# Patient Record
Sex: MALE | Race: WHITE
[De-identification: names, ages, dates, MRNs, and addresses within clinical notes are randomized per-mention and may not be internally consistent; named-entity substitution may affect disease eponyms.]

---

## 2019-08-01 ENCOUNTER — HOSPITAL ENCOUNTER (OUTPATIENT)
Dept: HOSPITAL 56 - MW.ED | Age: 29
Setting detail: OBSERVATION
LOS: 1 days | Discharge: HOME | End: 2019-08-02
Attending: INTERNAL MEDICINE | Admitting: INTERNAL MEDICINE
Payer: COMMERCIAL

## 2019-08-01 DIAGNOSIS — E66.01: ICD-10-CM

## 2019-08-01 DIAGNOSIS — Z79.84: ICD-10-CM

## 2019-08-01 DIAGNOSIS — I10: ICD-10-CM

## 2019-08-01 DIAGNOSIS — Z79.899: ICD-10-CM

## 2019-08-01 DIAGNOSIS — E78.5: ICD-10-CM

## 2019-08-01 DIAGNOSIS — E11.9: ICD-10-CM

## 2019-08-01 DIAGNOSIS — R07.9: Primary | ICD-10-CM

## 2019-08-01 DIAGNOSIS — Z87.891: ICD-10-CM

## 2019-08-01 LAB
BUN SERPL-MCNC: 22 MG/DL (ref 7–18)
CHLORIDE SERPL-SCNC: 102 MMOL/L (ref 98–107)
CO2 SERPL-SCNC: 25.9 MMOL/L (ref 21–32)
GLUCOSE SERPL-MCNC: 172 MG/DL (ref 74–106)
HBA1C BLD-MCNC: 7.3 % (ref 4.5–6.2)
POTASSIUM SERPL-SCNC: 4.5 MMOL/L (ref 3.5–5.1)
SODIUM SERPL-SCNC: 139 MMOL/L (ref 136–148)

## 2019-08-01 PROCEDURE — 84443 ASSAY THYROID STIM HORMONE: CPT

## 2019-08-01 PROCEDURE — 80061 LIPID PANEL: CPT

## 2019-08-01 PROCEDURE — 80305 DRUG TEST PRSMV DIR OPT OBS: CPT

## 2019-08-01 PROCEDURE — 82962 GLUCOSE BLOOD TEST: CPT

## 2019-08-01 PROCEDURE — 93005 ELECTROCARDIOGRAM TRACING: CPT

## 2019-08-01 PROCEDURE — 85027 COMPLETE CBC AUTOMATED: CPT

## 2019-08-01 PROCEDURE — 85025 COMPLETE CBC W/AUTO DIFF WBC: CPT

## 2019-08-01 PROCEDURE — 81001 URINALYSIS AUTO W/SCOPE: CPT

## 2019-08-01 PROCEDURE — 80053 COMPREHEN METABOLIC PANEL: CPT

## 2019-08-01 PROCEDURE — 85379 FIBRIN DEGRADATION QUANT: CPT

## 2019-08-01 PROCEDURE — 84439 ASSAY OF FREE THYROXINE: CPT

## 2019-08-01 PROCEDURE — 99285 EMERGENCY DEPT VISIT HI MDM: CPT

## 2019-08-01 PROCEDURE — 84484 ASSAY OF TROPONIN QUANT: CPT

## 2019-08-01 PROCEDURE — 80048 BASIC METABOLIC PNL TOTAL CA: CPT

## 2019-08-01 PROCEDURE — 71045 X-RAY EXAM CHEST 1 VIEW: CPT

## 2019-08-01 PROCEDURE — 84481 FREE ASSAY (FT-3): CPT

## 2019-08-01 PROCEDURE — 83036 HEMOGLOBIN GLYCOSYLATED A1C: CPT

## 2019-08-01 PROCEDURE — 85610 PROTHROMBIN TIME: CPT

## 2019-08-01 PROCEDURE — 36415 COLL VENOUS BLD VENIPUNCTURE: CPT

## 2019-08-01 PROCEDURE — 93306 TTE W/DOPPLER COMPLETE: CPT

## 2019-08-01 PROCEDURE — G0378 HOSPITAL OBSERVATION PER HR: HCPCS

## 2019-08-01 RX ADMIN — NITROGLYCERIN PRN MG: 0.4 TABLET SUBLINGUAL at 10:16

## 2019-08-01 RX ADMIN — NITROGLYCERIN PRN MG: 0.4 TABLET SUBLINGUAL at 10:22

## 2019-08-01 RX ADMIN — NITROGLYCERIN PRN MG: 0.4 TABLET SUBLINGUAL at 10:09

## 2019-08-01 NOTE — PCM.HP
H&P History of Present Illness





- General


Date of Service: 08/01/19


Admit Problem/Dx: 


 Admission Diagnosis/Problem





Admission Diagnosis/Problem      Chest pain











- History of Present Illness


Initial Comments - Free Text/Narative: 





29 y/o male with history of type 2 diabetes who presented to the ER complaining 

of chest pain. Patient was at work and stated he suddenly started feeling sharp 

substernal chest pain. No radiation to neck or arm. States pain lasted more 

than 20 minutes. Tender to palpation on sternocostal area. No trauma to chest. 

No history of anxiety, panic attacks. States he was feeling short of breath as 

well and nauseous. NO vomiting. No history of asthma or acid reflux. Pain 

resolved after nitro paste was administered. Received full dose aspirin. EKG 

did not show any acute ST changes. Sinus rhythm.


Former smoker quit about 2-3 months ago. No alcohol drinking. States he takes 

metformin and recently stopped taking lisinopril. No statin. 


  ** Left Chest


Pain Score (Numeric/FACES): 5





- Related Data


Allergies/Adverse Reactions: 


 Allergies











Allergy/AdvReac Type Severity Reaction Status Date / Time


 


No Known Allergies Allergy   Verified 08/01/19 09:44











Home Medications: 


 Home Meds





metFORMIN [Glucophage] 500 mg PO DAILY 08/01/19 [History]











Past Medical History





- Past Health History


Medical/Surgical History: Denies Medical/Surgical History


Cardiovascular History: Reports: Hypertension


Musculoskeletal History: Reports: Back Pain, Chronic


Neurological History: Reports: Concussion, Seizure


Endocrine/Metabolic History: Reports: Diabetes, Type II





- Infectious Disease History


Infectious Disease History: Reports: Chicken Pox





- Past Surgical History


HEENT Surgical History: Reports: Adenoidectomy, Tonsillectomy


Cardiovascular Surgical History: Reports: None


Endocrine Surgical History: Reports: None


Neurological Surgical History: Reports: None


Musculoskeletal Surgical History: Reports: None





Social & Family History





- Family History


Family Medical History: Noncontributory


Cardiac: Reports: Cardiomyopathy, High Cholesterol, Hypertension


Endocrine/Metabolic: Reports: Diabetes, Type I, Diabetes, type II





- Tobacco Use


Smoking Status *Q: Former Smoker


Years of Tobacco use: 2


Used Tobacco, but Quit: Yes


Month/Year Tobacco Last Used: 06/2019





- Caffeine Use


Caffeine Use: Reports: None


Caffeine Use Comment: occasional caffeine use





- Recreational Drug Use


Recreational Drug Use: No





H&P Review of Systems





- Review of Systems:


Review Of Systems: ROS reveals no pertinent complaints other than HPI.





Exam





- Exam


Exam: See Below





- Vital Signs


Vital Signs: 


 Last Vital Signs











Temp  36.3 C   08/01/19 12:13


 


Pulse  87   08/01/19 12:13


 


Resp  18   08/01/19 12:13


 


BP  142/95 H  08/01/19 12:13


 


Pulse Ox  95   08/01/19 12:13











Weight: 204.117 kg





- Exam


General: Alert, Oriented, Cooperative


Lungs: Clear to Auscultation, Normal Respiratory Effort.  No: Crackles, Rhonchi

, Wheezing


Cardiovascular: Regular Rate, Regular Rhythm, Other (left sternocostal region 

is tender to palpation.).  No: Systolic Murmur


GI/Abdominal Exam: Normal Bowel Sounds, Soft, Non-Tender


Back Exam: Normal Inspection.  No: CVA Tenderness (L), CVA Tenderness (R)


Extremities: Normal Inspection, Normal Capillary Refill


Skin: Warm, Dry


Neuro Extensive - Mental Status: Alert, Oriented x3





- Patient Data


Lab Results Last 24 hrs: 


 Laboratory Results - last 24 hr











  08/01/19 08/01/19 08/01/19 Range/Units





  09:45 09:45 09:45 


 


WBC  7.96    (4.0-11.0)  K/uL


 


RBC  5.78    (4.50-5.90)  M/uL


 


Hgb  15.4    (13.0-17.0)  g/dL


 


Hct  46.9    (38.0-50.0)  %


 


MCV  81.1    (80.0-98.0)  fL


 


MCH  26.6 L    (27.0-32.0)  pg


 


MCHC  32.8    (31.0-37.0)  g/dL


 


RDW Std Deviation  41.7    (28.0-62.0)  fl


 


RDW Coeff of Dion  14    (11.0-15.0)  %


 


Plt Count  311    (150-400)  K/uL


 


MPV  9.50    (7.40-12.00)  fL


 


Neut % (Auto)  54.0    (48.0-80.0)  %


 


Lymph % (Auto)  36.1    (16.0-40.0)  %


 


Mono % (Auto)  7.5    (0.0-15.0)  %


 


Eos % (Auto)  2.0    (0.0-7.0)  %


 


Baso % (Auto)  0.4    (0.0-1.5)  %


 


Neut # (Auto)  4.3    (1.4-5.7)  K/uL


 


Lymph # (Auto)  2.9 H    (0.6-2.4)  K/uL


 


Mono # (Auto)  0.6    (0.0-0.8)  K/uL


 


Eos # (Auto)  0.2    (0.0-0.7)  K/uL


 


Baso # (Auto)  0.0    (0.0-0.1)  K/uL


 


Nucleated RBC %  0.0    /100WBC


 


Nucleated RBCs #  0    K/uL


 


INR   0.99   


 


D-Dimer, Quantitative   < 0.19   (0.0-0.50)  mg/L FEU


 


Sodium    139  (136-148)  mmol/L


 


Potassium    4.5  (3.5-5.1)  mmol/L


 


Chloride    102  ()  mmol/L


 


Carbon Dioxide    25.9  (21.0-32.0)  mmol/L


 


BUN    22 H  (7.0-18.0)  mg/dL


 


Creatinine    1.2  (0.8-1.3)  mg/dL


 


Est Cr Clr Drug Dosing    117.42  mL/min


 


Estimated GFR (MDRD)    > 60.0  ml/min


 


Glucose    172 H  ()  mg/dL


 


Hemoglobin A1c     (4.5-6.2)  %


 


Calcium    9.9  (8.5-10.1)  mg/dL


 


Total Bilirubin    0.4  (0.2-1.0)  mg/dL


 


AST    22  (15-37)  IU/L


 


ALT    65 H  (14-63)  IU/L


 


Alkaline Phosphatase    100  ()  U/L


 


Troponin I    < 0.050  (0.000-0.056)  ng/mL


 


Total Protein    8.2  (6.4-8.2)  g/dL


 


Albumin    4.5  (3.4-5.0)  g/dL


 


Globulin    3.7  (2.6-4.0)  g/dL


 


Albumin/Globulin Ratio    1.2  (0.9-1.6)  


 


Triglycerides     (0-200)  mg/dL


 


Cholesterol     ()  mg/dL


 


HDL Cholesterol     (40-60)  mg/dL


 


Cholesterol/HDL Ratio     (3.3-6.0)  


 


TSH 3rd Generation     (0.36-3.74)  uIU/mL


 


Urine Color     


 


Urine Appearance     


 


Urine pH     (5.0-8.0)  


 


Ur Specific Gravity     (1.001-1.035)  


 


Urine Protein     (NEGATIVE)  mg/dL


 


Urine Glucose (UA)     (NEGATIVE)  mg/dL


 


Urine Ketones     (NEGATIVE)  mg/dL


 


Urine Occult Blood     (NEGATIVE)  


 


Urine Nitrite     (NEGATIVE)  


 


Urine Bilirubin     (NEGATIVE)  


 


Urine Urobilinogen     (<2.0)  EU/dL


 


Ur Leukocyte Esterase     (NEGATIVE)  


 


Urine RBC     (0-2/HPF)  


 


Urine WBC     (0-5/HPF)  


 


Ur Epithelial Cells     (NONE-FEW)  


 


Urine Bacteria     (NEGATIVE)  


 


Urine Opiates Screen     (NEGATIVE)  


 


Ur Oxycodone Screen     (NEGATIVE)  


 


Urine Methadone Screen     (NEGATIVE)  


 


Ur Barbiturates Screen     (NEGATIVE)  


 


Ur Phencyclidine Scrn     (NEGATIVE)  


 


Ur Amphetamine Screen     (NEGATIVE)  


 


U Methamphetamines Scrn     (NEGATIVE)  


 


U Benzodiazepines Scrn     (NEGATIVE)  


 


U Cocaine Metab Screen     (NEGATIVE)  


 


U Marijuana (THC) Screen     (NEGATIVE)  














  08/01/19 08/01/19 08/01/19 Range/Units





  09:45 09:45 11:30 


 


WBC     (4.0-11.0)  K/uL


 


RBC     (4.50-5.90)  M/uL


 


Hgb     (13.0-17.0)  g/dL


 


Hct     (38.0-50.0)  %


 


MCV     (80.0-98.0)  fL


 


MCH     (27.0-32.0)  pg


 


MCHC     (31.0-37.0)  g/dL


 


RDW Std Deviation     (28.0-62.0)  fl


 


RDW Coeff of Dion     (11.0-15.0)  %


 


Plt Count     (150-400)  K/uL


 


MPV     (7.40-12.00)  fL


 


Neut % (Auto)     (48.0-80.0)  %


 


Lymph % (Auto)     (16.0-40.0)  %


 


Mono % (Auto)     (0.0-15.0)  %


 


Eos % (Auto)     (0.0-7.0)  %


 


Baso % (Auto)     (0.0-1.5)  %


 


Neut # (Auto)     (1.4-5.7)  K/uL


 


Lymph # (Auto)     (0.6-2.4)  K/uL


 


Mono # (Auto)     (0.0-0.8)  K/uL


 


Eos # (Auto)     (0.0-0.7)  K/uL


 


Baso # (Auto)     (0.0-0.1)  K/uL


 


Nucleated RBC %     /100WBC


 


Nucleated RBCs #     K/uL


 


INR     


 


D-Dimer, Quantitative     (0.0-0.50)  mg/L FEU


 


Sodium     (136-148)  mmol/L


 


Potassium     (3.5-5.1)  mmol/L


 


Chloride     ()  mmol/L


 


Carbon Dioxide     (21.0-32.0)  mmol/L


 


BUN     (7.0-18.0)  mg/dL


 


Creatinine     (0.8-1.3)  mg/dL


 


Est Cr Clr Drug Dosing     mL/min


 


Estimated GFR (MDRD)     ml/min


 


Glucose     ()  mg/dL


 


Hemoglobin A1c   7.3 H   (4.5-6.2)  %


 


Calcium     (8.5-10.1)  mg/dL


 


Total Bilirubin     (0.2-1.0)  mg/dL


 


AST     (15-37)  IU/L


 


ALT     (14-63)  IU/L


 


Alkaline Phosphatase     ()  U/L


 


Troponin I     (0.000-0.056)  ng/mL


 


Total Protein     (6.4-8.2)  g/dL


 


Albumin     (3.4-5.0)  g/dL


 


Globulin     (2.6-4.0)  g/dL


 


Albumin/Globulin Ratio     (0.9-1.6)  


 


Triglycerides  405 H    (0-200)  mg/dL


 


Cholesterol  221 H    ()  mg/dL


 


HDL Cholesterol  35 L    (40-60)  mg/dL


 


Cholesterol/HDL Ratio  6.3 H    (3.3-6.0)  


 


TSH 3rd Generation  4.35 H    (0.36-3.74)  uIU/mL


 


Urine Color    YELLOW  


 


Urine Appearance    CLEAR  


 


Urine pH    5.5  (5.0-8.0)  


 


Ur Specific Gravity    >= 1.030  (1.001-1.035)  


 


Urine Protein    TRACE H  (NEGATIVE)  mg/dL


 


Urine Glucose (UA)    250 H  (NEGATIVE)  mg/dL


 


Urine Ketones    TRACE H  (NEGATIVE)  mg/dL


 


Urine Occult Blood    NEGATIVE  (NEGATIVE)  


 


Urine Nitrite    NEGATIVE  (NEGATIVE)  


 


Urine Bilirubin    NEGATIVE  (NEGATIVE)  


 


Urine Urobilinogen    0.2  (<2.0)  EU/dL


 


Ur Leukocyte Esterase    NEGATIVE  (NEGATIVE)  


 


Urine RBC    0-1  (0-2/HPF)  


 


Urine WBC    0-1  (0-5/HPF)  


 


Ur Epithelial Cells    RARE  (NONE-FEW)  


 


Urine Bacteria    RARE  (NEGATIVE)  


 


Urine Opiates Screen     (NEGATIVE)  


 


Ur Oxycodone Screen     (NEGATIVE)  


 


Urine Methadone Screen     (NEGATIVE)  


 


Ur Barbiturates Screen     (NEGATIVE)  


 


Ur Phencyclidine Scrn     (NEGATIVE)  


 


Ur Amphetamine Screen     (NEGATIVE)  


 


U Methamphetamines Scrn     (NEGATIVE)  


 


U Benzodiazepines Scrn     (NEGATIVE)  


 


U Cocaine Metab Screen     (NEGATIVE)  


 


U Marijuana (THC) Screen     (NEGATIVE)  














  08/01/19 Range/Units





  11:30 


 


WBC   (4.0-11.0)  K/uL


 


RBC   (4.50-5.90)  M/uL


 


Hgb   (13.0-17.0)  g/dL


 


Hct   (38.0-50.0)  %


 


MCV   (80.0-98.0)  fL


 


MCH   (27.0-32.0)  pg


 


MCHC   (31.0-37.0)  g/dL


 


RDW Std Deviation   (28.0-62.0)  fl


 


RDW Coeff of Dion   (11.0-15.0)  %


 


Plt Count   (150-400)  K/uL


 


MPV   (7.40-12.00)  fL


 


Neut % (Auto)   (48.0-80.0)  %


 


Lymph % (Auto)   (16.0-40.0)  %


 


Mono % (Auto)   (0.0-15.0)  %


 


Eos % (Auto)   (0.0-7.0)  %


 


Baso % (Auto)   (0.0-1.5)  %


 


Neut # (Auto)   (1.4-5.7)  K/uL


 


Lymph # (Auto)   (0.6-2.4)  K/uL


 


Mono # (Auto)   (0.0-0.8)  K/uL


 


Eos # (Auto)   (0.0-0.7)  K/uL


 


Baso # (Auto)   (0.0-0.1)  K/uL


 


Nucleated RBC %   /100WBC


 


Nucleated RBCs #   K/uL


 


INR   


 


D-Dimer, Quantitative   (0.0-0.50)  mg/L FEU


 


Sodium   (136-148)  mmol/L


 


Potassium   (3.5-5.1)  mmol/L


 


Chloride   ()  mmol/L


 


Carbon Dioxide   (21.0-32.0)  mmol/L


 


BUN   (7.0-18.0)  mg/dL


 


Creatinine   (0.8-1.3)  mg/dL


 


Est Cr Clr Drug Dosing   mL/min


 


Estimated GFR (MDRD)   ml/min


 


Glucose   ()  mg/dL


 


Hemoglobin A1c   (4.5-6.2)  %


 


Calcium   (8.5-10.1)  mg/dL


 


Total Bilirubin   (0.2-1.0)  mg/dL


 


AST   (15-37)  IU/L


 


ALT   (14-63)  IU/L


 


Alkaline Phosphatase   ()  U/L


 


Troponin I   (0.000-0.056)  ng/mL


 


Total Protein   (6.4-8.2)  g/dL


 


Albumin   (3.4-5.0)  g/dL


 


Globulin   (2.6-4.0)  g/dL


 


Albumin/Globulin Ratio   (0.9-1.6)  


 


Triglycerides   (0-200)  mg/dL


 


Cholesterol   ()  mg/dL


 


HDL Cholesterol   (40-60)  mg/dL


 


Cholesterol/HDL Ratio   (3.3-6.0)  


 


TSH 3rd Generation   (0.36-3.74)  uIU/mL


 


Urine Color   


 


Urine Appearance   


 


Urine pH   (5.0-8.0)  


 


Ur Specific Gravity   (1.001-1.035)  


 


Urine Protein   (NEGATIVE)  mg/dL


 


Urine Glucose (UA)   (NEGATIVE)  mg/dL


 


Urine Ketones   (NEGATIVE)  mg/dL


 


Urine Occult Blood   (NEGATIVE)  


 


Urine Nitrite   (NEGATIVE)  


 


Urine Bilirubin   (NEGATIVE)  


 


Urine Urobilinogen   (<2.0)  EU/dL


 


Ur Leukocyte Esterase   (NEGATIVE)  


 


Urine RBC   (0-2/HPF)  


 


Urine WBC   (0-5/HPF)  


 


Ur Epithelial Cells   (NONE-FEW)  


 


Urine Bacteria   (NEGATIVE)  


 


Urine Opiates Screen  NEGATIVE  (NEGATIVE)  


 


Ur Oxycodone Screen  NEGATIVE  (NEGATIVE)  


 


Urine Methadone Screen  NEGATIVE  (NEGATIVE)  


 


Ur Barbiturates Screen  NEGATIVE  (NEGATIVE)  


 


Ur Phencyclidine Scrn  NEGATIVE  (NEGATIVE)  


 


Ur Amphetamine Screen  NEGATIVE  (NEGATIVE)  


 


U Methamphetamines Scrn  NEGATIVE  (NEGATIVE)  


 


U Benzodiazepines Scrn  NEGATIVE  (NEGATIVE)  


 


U Cocaine Metab Screen  NEGATIVE  (NEGATIVE)  


 


U Marijuana (THC) Screen  NEGATIVE  (NEGATIVE)  











Result Diagrams: 


 08/01/19 09:45





 08/01/19 09:45


Problem List Initiated/Reviewed/Updated: Yes


Orders Last 24hrs: 


 Active Orders 24 hr











 Category Date Time Status


 


 Patient Status [ADT] Stat ADT  08/01/19 11:29 Active


 


 [facility]  08/01/19 11:49 Active


 


 Cardiac Monitoring [RC] Q8H Care  08/01/19 09:59 Active


 


 EKG Documentation Completion [RC] STAT Care  08/01/19 09:59 Active


 


 Oxygen Therapy [RC] PRN Care  08/01/19 11:43 Active


 


 Oxygen Therapy, ED [RC] ASDIRECTED Care  08/01/19 09:59 Active


 


 Pulse Oximetry [RC] ASDIRECTED Care  08/01/19 09:59 Active


 


 Up ad Kristina [RC] ASDIRECTED Care  08/01/19 11:43 Active


 


 VTE/DVT Education [RC] PER UNIT ROUTINE Care  08/01/19 11:43 Active


 


 Vital Signs [RC] Q4H Care  08/01/19 11:43 Active


 


 Heart Healthy Diet [DIET] Diet  08/01/19 Lunch Active


 


 T3 FREE [CHEM] Routine Lab  08/01/19 13:55 Ordered


 


 T4 FREE [CHEM] Routine Lab  08/01/19 13:55 Ordered


 


 TROPONIN I [CHEM] Q6H Lab  08/01/19 16:00 Ordered


 


 TROPONIN I [CHEM] Q6H Lab  08/01/19 22:00 Ordered


 


 Acetaminophen [Tylenol] Med  08/01/19 11:43 Active





 650 mg PO Q4H PRN   


 


 Acetaminophen/HYDROcodone [Norco 325-5 MG] Med  08/01/19 11:43 Active





 1 tab PO Q4H PRN   


 


 Enoxaparin [Lovenox] Med  08/01/19 11:45 Active





 40 mg SUBCUT Q24H   


 


 Insulin Aspart [NovoLOG] Med  08/01/19 17:00 Active





 See Protocol  SUBCUT TIDAC   


 


 Lisinopril [Prinivil] Med  08/01/19 14:00 Ordered





 5 mg PO DAILY   


 


 Morphine Med  08/01/19 11:43 Active





 2 mg IVPUSH Q2H PRN   


 


 Nitroglycerin [Nitrostat] Med  08/01/19 11:47 Active





 0.4 mg SL Q5M PRN   


 


 Ondansetron [Zofran ODT] Med  08/01/19 11:43 Active





 4 mg PO Q4H PRN   


 


 Ondansetron [Zofran] Med  08/01/19 11:43 Active





 4 mg IVPUSH Q4H PRN   


 


 Sodium Chloride 0.9% [Saline Flush] Med  08/01/19 09:59 Active





 10 ml FLUSH ASDIRECTED PRN   


 


 Sodium Chloride 0.9% [Saline Flush] Med  08/01/19 09:59 Active





 2.5 ml FLUSH ASDIRECTED PRN   


 


 atorvaSTATin [Lipitor] Med  08/01/19 21:00 Ordered





 40 mg PO BEDTIME   


 


 Saline Lock Insert [OM.PC] Stat Oth  08/01/19 09:59 Ordered


 


 Resuscitation Status Routine Resus Stat  08/01/19 11:43 Ordered








 Medication Orders





Acetaminophen (Tylenol)  650 mg PO Q4H PRN


   PRN Reason: Pain (Mild 1-3)/fever


Hydrocodone Bitart/Acetaminophen (Norco 325-5 Mg)  1 tab PO Q4H PRN


   PRN Reason: Pain (moderate 4-6)


Atorvastatin Calcium (Lipitor)  40 mg PO BEDTIME HARVEY


Enoxaparin Sodium (Lovenox)  40 mg SUBCUT Q24H HARVEY


   Last Admin: 08/01/19 13:16  Dose: 40 mg


Insulin Aspart (Novolog)  0 unit SUBCUT TIDAC HARVEY; Protocol


Lisinopril (Prinivil)  5 mg PO DAILY HARVEY


Morphine Sulfate (Morphine)  2 mg IVPUSH Q2H PRN


   PRN Reason: Pain (severe 7-10)


   Stop: 08/02/19 11:45


Nitroglycerin (Nitrostat)  0.4 mg SL Q5M PRN


   PRN Reason: Chest Pain


Ondansetron HCl (Zofran Odt)  4 mg PO Q4H PRN


   PRN Reason: nausea, able to take PO


Ondansetron HCl (Zofran)  4 mg IVPUSH Q4H PRN


   PRN Reason: Nausea


Sodium Chloride (Saline Flush)  10 ml FLUSH ASDIRECTED PRN


   PRN Reason: Keep Vein Open


   Last Admin: 08/01/19 10:09  Dose: 10 ml


Sodium Chloride (Saline Flush)  2.5 ml FLUSH ASDIRECTED PRN


   PRN Reason: Keep Vein Open


   Last Admin: 08/01/19 10:09  Dose: 2.5 ml








Assessment/Plan Comment:: 





A:


1. Chest pain, r/o ACS


2. Diabetes type 2


3. Dyslipidemia


4. Hypertension


5. Morbid obesity





P:


1. Chest pain- suspect sternocostal inflammation. However, since patient is 

diabetic, hypercholesterolemia, obese and former smoker will trend troponins, 

telemetry. Nitroglycerin PRN, morphine for pain control. Ordered Echo.


2. DM2- Recent HgA1c 7.3. Will start ISS. Holding metformin for now.


3. Dyslipidemia- will start Lipitor 40 mg PO daily.


4. Hypertension- will start lisinopril 5 mg PO daily.





Dispo: likely dc tomorrow.

## 2019-08-01 NOTE — CR
EXAM DATE: 19



PATIENT'S AGE: 28





Patient: FREDDIE MO



Facility: [facility] Patient ID: [id number]

Site Patient ID: [id number].

Site Accession #: [id number].

: 1990

Study: XRay-Chest [id number]/1/2019 11:22:55 AM

Ordering Physician: Hermilo Saldana

Final Report: 

INDICATION:

Shortness of breath.



COMPARISON:

None.



TECHNIQUE:

Portable AP chest.



FINDINGS:

Normal size cardiac silhouette. Clear lung fields without evidence of acute 
pneumonic infiltrates or CHF. No pneumothorax or pleural effusion.

Impression: Negative portable AP chest.



Dictated by Nani Orta MD @ Aug 1 2019 11:54AM

Signed by: Nani Orta MD @2019 11:55:59 AM

(Electronic Signature)



Report Signed by Proxy.
[facility]

## 2019-08-01 NOTE — EDM.PDOC
ED HPI GENERAL MEDICAL PROBLEM





- General


Chief Complaint: Chest Pain


Stated Complaint: CHEST PAIN


Time Seen by Provider: 08/01/19 09:53





- History of Present Illness


INITIAL COMMENTS - FREE TEXT/NARRATIVE: 





HISTORY AND PHYSICAL:





History of present illness:


The patient is a 28-year-old male who has a history of non-insulin-dependent 

diabetes and has a half a pack per day smoking history for which he quit 2 

months ago and presents with left-sided chest pain that started about 8:15 this 

morning. Patient said he had a completely normal day yesterday and has had no 

systemic issues and was awake and at work when this started. The patient says 

that it started gradually with some pressure just to the left of his sternum 

and then radiated slightly to the left breast area and then he had some sharp 

discomfort laterally on the left. There was some associated shortness of breath 

but no diaphoresis nausea or vomiting and no abdominal pain. He says that he 

came on gradually and he took some Aleve but it did not help. He says that he 

rates it as a 4 or 5 currently. He has no leg pain or swelling no abdominal 

complaints. He says he has no cough with this fevers or chills and has been 

eating and drinking normally. He says he did not do anything particularly 

strenuous today. The patient does admit that he has a history of cocaine use 

but no IV drug use and has not used cocaine for 3 years. He says he has no 

family history with his mother brothers or sisters for cardiac or pulmonary 

disease and he does not know very much about his father and so this is an 

unknown . The patient denies any leg pain or swelling.





Review of systems: 


As per history of present illness and below otherwise all systems reviewed and 

negative.





Past medical history: 


As per history of present illness and as reviewed below otherwise 

noncontributory.





Surgical history: 


As per history of present illness and as reviewed below otherwise 

noncontributory.





Social history: 


No reported history of drug or alcohol abuse.





Family history: 


As per history of present illness and as reviewed below otherwise 

noncontributory.





Physical exam:


General: Well-developed well-nourished obese man who is nontoxic and speaking 

clearly in the ED without breathlessness. Vital signs are noted by me.


HEENT: Atraumatic, normocephalic, pupils reactive, negative for conjunctival 

pallor or scleral icterus, mucous membranes moist, throat clear, neck supple, 

nontender, trachea midline.


Lungs: Clear to auscultation, breath sounds equal bilaterally, chest wall with 

some mild tenderness on palpation just to the left of the sternum but I cannot 

completely reproduce the pain and there is no defects deformities or crepitus


Heart: S1S2, regular, negative for clicks, rubs, or JVD.


Abdomen: Soft, nondistended, nontender. Negative for masses or 

hepatosplenomegaly. Negative for costovertebral tenderness.


Pelvis: Stable nontender.


Genitourinary: Deferred.


Rectal: Deferred.


Extremities: Atraumatic, negative for cords or calf pain. Neurovascular 

unremarkable. No pedal edema or leg asymmetry


Neuro: Awake, alert, oriented. Cranial nerves II through XII unremarkable. 

Cerebellum unremarkable. Motor and sensory unremarkable throughout. Exam 

nonfocal.





Diagnostics:


EKG CBC CMP INR troponin d-dimer UA UDS chest x-ray





Therapeutics:


IV O2 monitor aspirin nitroglycerin sublingual, nitroglycerin paste





Reevaluation--after 3 sublingual nitroglycerin the patient has no chest 

discomfort. Blood pressure has normalized and a half inch of nitro paste will 

be placed.


1130: TESTING results were discussed with the patient and observation admission 

has been advised. He remains pain-free. He is accepting of the admission. I 

discussed the case with Dr. Castaneda, the resident working with Dr. Rosas, and 

he accepts the patient for admission.


Impression: 


Chest pain rule out ACS





Definitive disposition and diagnosis as appropriate pending reevaluation and 

review of above.


  ** Left Chest


Pain Score (Numeric/FACES): 5





- Related Data


 Allergies











Allergy/AdvReac Type Severity Reaction Status Date / Time


 


No Known Allergies Allergy   Verified 08/01/19 09:44











Home Meds: 


 Home Meds





metFORMIN [Glucophage] 500 mg PO DAILY 08/01/19 [History]











Past Medical History





- Past Health History


Medical/Surgical History: Denies Medical/Surgical History





- Infectious Disease History


Infectious Disease History: Reports: None





- Past Surgical History


HEENT Surgical History: Reports: Adenoidectomy, Tonsillectomy





Social & Family History





- Family History


Family Medical History: Noncontributory


Cardiac: Reports: Cardiomyopathy, High Cholesterol, Hypertension


Endocrine/Metabolic: Reports: Diabetes, Type I, Diabetes, type II





- Tobacco Use


Smoking Status *Q: Former Smoker


Used Tobacco, but Quit: Yes


Month/Year Tobacco Last Used: 2019





- Caffeine Use


Caffeine Use: Reports: Other


Caffeine Use Comment: occasional caffeine use





- Recreational Drug Use


Recreational Drug Use: No





ED ROS GENERAL





- Review of Systems


Review Of Systems: ROS reveals no pertinent complaints other than HPI.





ED EXAM, GENERAL





- Physical Exam


Exam: See Below (See dictation)





Course





- Vital Signs


Last Recorded V/S: 


 Last Vital Signs











Temp  36.0 C   08/01/19 09:44


 


Pulse  87   08/01/19 09:44


 


Resp  18   08/01/19 11:10


 


BP  161/110 H  08/01/19 11:10


 


Pulse Ox  95   08/01/19 11:10














- Orders/Labs/Meds


Orders: 


 Active Orders 24 hr











 Category Date Time Status


 


 Patient Status [ADT] Stat ADT  08/01/19 11:29 Ordered


 


 Cardiac Monitoring [RC] .AS DIRECTED Care  08/01/19 09:59 Active


 


 EKG Documentation Completion [RC] STAT Care  08/01/19 09:59 Active


 


 Oxygen Therapy, ED [RC] ASDIRECTED Care  08/01/19 09:59 Active


 


 Pulse Oximetry [RC] ASDIRECTED Care  08/01/19 09:59 Active


 


 Chest 1V Frontal [CR] Stat Exams  08/01/19 10:04 Taken


 


 DRUG SCREEN, URINE [URCHEM] Stat Lab  08/01/19 09:59 Ordered


 


 UA RFX ROMEO AND CULT IF INDIC [URIN] Stat Lab  08/01/19 09:59 Ordered


 


 Sodium Chloride 0.9% [Saline Flush] Med  08/01/19 09:59 Active





 10 ml FLUSH ASDIRECTED PRN   


 


 Sodium Chloride 0.9% [Saline Flush] Med  08/01/19 09:59 Active





 2.5 ml FLUSH ASDIRECTED PRN   


 


 Saline Lock Insert [OM.PC] Stat Oth  08/01/19 09:59 Ordered








 Medication Orders





Sodium Chloride (Saline Flush)  10 ml FLUSH ASDIRECTED PRN


   PRN Reason: Keep Vein Open


   Last Admin: 08/01/19 10:09  Dose: 10 ml


Sodium Chloride (Saline Flush)  2.5 ml FLUSH ASDIRECTED PRN


   PRN Reason: Keep Vein Open


   Last Admin: 08/01/19 10:09  Dose: 2.5 ml








Labs: 


 Laboratory Tests











  08/01/19 08/01/19 08/01/19 Range/Units





  09:45 09:45 09:45 


 


WBC  7.96    (4.0-11.0)  K/uL


 


RBC  5.78    (4.50-5.90)  M/uL


 


Hgb  15.4    (13.0-17.0)  g/dL


 


Hct  46.9    (38.0-50.0)  %


 


MCV  81.1    (80.0-98.0)  fL


 


MCH  26.6 L    (27.0-32.0)  pg


 


MCHC  32.8    (31.0-37.0)  g/dL


 


RDW Std Deviation  41.7    (28.0-62.0)  fl


 


RDW Coeff of Dion  14    (11.0-15.0)  %


 


Plt Count  311    (150-400)  K/uL


 


MPV  9.50    (7.40-12.00)  fL


 


Neut % (Auto)  54.0    (48.0-80.0)  %


 


Lymph % (Auto)  36.1    (16.0-40.0)  %


 


Mono % (Auto)  7.5    (0.0-15.0)  %


 


Eos % (Auto)  2.0    (0.0-7.0)  %


 


Baso % (Auto)  0.4    (0.0-1.5)  %


 


Neut # (Auto)  4.3    (1.4-5.7)  K/uL


 


Lymph # (Auto)  2.9 H    (0.6-2.4)  K/uL


 


Mono # (Auto)  0.6    (0.0-0.8)  K/uL


 


Eos # (Auto)  0.2    (0.0-0.7)  K/uL


 


Baso # (Auto)  0.0    (0.0-0.1)  K/uL


 


Nucleated RBC %  0.0    /100WBC


 


Nucleated RBCs #  0    K/uL


 


INR   0.99   


 


D-Dimer, Quantitative   < 0.19   (0.0-0.50)  mg/L FEU


 


Sodium    139  (136-148)  mmol/L


 


Potassium    4.5  (3.5-5.1)  mmol/L


 


Chloride    102  ()  mmol/L


 


Carbon Dioxide    25.9  (21.0-32.0)  mmol/L


 


BUN    22 H  (7.0-18.0)  mg/dL


 


Creatinine    1.2  (0.8-1.3)  mg/dL


 


Est Cr Clr Drug Dosing    117.42  mL/min


 


Estimated GFR (MDRD)    > 60.0  ml/min


 


Glucose    172 H  ()  mg/dL


 


Calcium    9.9  (8.5-10.1)  mg/dL


 


Total Bilirubin    0.4  (0.2-1.0)  mg/dL


 


AST    22  (15-37)  IU/L


 


ALT    65 H  (14-63)  IU/L


 


Alkaline Phosphatase    100  ()  U/L


 


Troponin I    < 0.050  (0.000-0.056)  ng/mL


 


Total Protein    8.2  (6.4-8.2)  g/dL


 


Albumin    4.5  (3.4-5.0)  g/dL


 


Globulin    3.7  (2.6-4.0)  g/dL


 


Albumin/Globulin Ratio    1.2  (0.9-1.6)  











Meds: 


Medications











Generic Name Dose Route Start Last Admin





  Trade Name [place]  PRN Reason Stop Dose Admin


 


Sodium Chloride  10 ml  08/01/19 09:59  08/01/19 10:09





  Saline Flush  FLUSH   10 ml





  ASDIRECTED PRN   Administration





  Keep Vein Open   





     





     





     


 


Sodium Chloride  2.5 ml  08/01/19 09:59  08/01/19 10:09





  Saline Flush  FLUSH   2.5 ml





  ASDIRECTED PRN   Administration





  Keep Vein Open   





     





     





     














Discontinued Medications














Generic Name Dose Route Start Last Admin





  Trade Name Freq  PRN Reason Stop Dose Admin


 


Aspirin  324 mg  08/01/19 09:59  08/01/19 10:09





  Aspirin  PO  08/01/19 10:00  324 mg





  ONETIME ONE   Administration





     





     





     





     


 


Nitroglycerin  0.4 mg  08/01/19 09:59  08/01/19 10:22





  Nitrostat  SL   0.4 mg





  Q5M PRN   Administration





  Chest Pain   





     





     





     


 


Nitroglycerin  0.5 gm  08/01/19 10:30  08/01/19 10:40





  Nitro-Bid 2%  TOP  08/01/19 10:31  0.5 gm





  ONETIME ONE   Administration





     





     





     





     














Departure





- Departure


Time of Disposition: 11:32


Disposition: Refer to Observation


Condition: Good


Clinical Impression: 


Chest pain


Qualifiers:


 Chest pain type: unspecified Qualified Code(s): R07.9 - Chest pain, unspecified








- Discharge Information


Referrals: 


PCP,None [Primary Care Provider] - 


Forms:  ED Department Discharge





- My Orders


Last 24 Hours: 


My Active Orders





08/01/19 09:59


Cardiac Monitoring [RC] .AS DIRECTED 


EKG Documentation Completion [RC] STAT 


Oxygen Therapy, ED [RC] ASDIRECTED 


Pulse Oximetry [RC] ASDIRECTED 


DRUG SCREEN, URINE [URCHEM] Stat 


UA RFX ROMEO AND CULT IF INDIC [URIN] Stat 


Sodium Chloride 0.9% [Saline Flush]   10 ml FLUSH ASDIRECTED PRN 


Sodium Chloride 0.9% [Saline Flush]   2.5 ml FLUSH ASDIRECTED PRN 


Saline Lock Insert [OM.PC] Stat 





08/01/19 10:04


Chest 1V Frontal [CR] Stat 





08/01/19 11:29


Patient Status [ADT] Stat 














- Assessment/Plan


Last 24 Hours: 


My Active Orders





08/01/19 09:59


Cardiac Monitoring [RC] .AS DIRECTED 


EKG Documentation Completion [RC] STAT 


Oxygen Therapy, ED [RC] ASDIRECTED 


Pulse Oximetry [RC] ASDIRECTED 


DRUG SCREEN, URINE [URCHEM] Stat 


UA RFX ROMEO AND CULT IF INDIC [URIN] Stat 


Sodium Chloride 0.9% [Saline Flush]   10 ml FLUSH ASDIRECTED PRN 


Sodium Chloride 0.9% [Saline Flush]   2.5 ml FLUSH ASDIRECTED PRN 


Saline Lock Insert [OM.PC] Stat 





08/01/19 10:04


Chest 1V Frontal [CR] Stat 





08/01/19 11:29


Patient Status [ADT] Stat

## 2019-08-02 VITALS — DIASTOLIC BLOOD PRESSURE: 75 MMHG | HEART RATE: 82 BPM | SYSTOLIC BLOOD PRESSURE: 135 MMHG

## 2019-08-02 LAB
BUN SERPL-MCNC: 18 MG/DL (ref 7–18)
CHLORIDE SERPL-SCNC: 102 MMOL/L (ref 98–107)
CO2 SERPL-SCNC: 28.3 MMOL/L (ref 21–32)
GLUCOSE SERPL-MCNC: 149 MG/DL (ref 74–106)
POTASSIUM SERPL-SCNC: 4.3 MMOL/L (ref 3.5–5.1)
SODIUM SERPL-SCNC: 138 MMOL/L (ref 136–148)

## 2019-08-02 NOTE — [id number]
<Micah Corral - Last Filed: 08/02/19 10:56>





**Discharge Summary





- Hospital Course


Free Text/Narrative:: 





29 y/o male with history of diabetes type 2 who presented to the ER complaining 

of chest pain. In the ER, EKG did not show any ST elevation. Initial troponin 

was negative. He was admitted for ACS rule out. Troponins were trended and 

remained negative. Echo results pending at time of discharge. chest pain 

resolved over night. In addition, he was started on lipitor, lisinopril due to 

Hg A1c 7.3. He was advised to follow-up with his PCP for diabetes management. 

May consider starting patient on antacid medication.





- Discharge Data


Discharge Date: 08/02/19


Discharge Disposition: Home, Self-Care 01


Condition: Good





- Patient Instructions


Diet: Diabetic Diet


Activity: As Tolerated


Notify Provider of: Fever, Increased Pain, Swelling and Redness, Nausea and/or 

Vomiting





- Discharge Plan


*PRESCRIPTION DRUG MONITORING PROGRAM REVIEWED*: Not Applicable


*COPY OF PRESCRIPTION DRUG MONITORING REPORT IN PATIENT ANNETTE: Not Applicable


Prescriptions/Med Rec: 


atorvaSTATin [Lipitor] 40 mg PO BEDTIME #30 tablet


Lisinopril [Prinivil] 5 mg PO DAILY #30 tablet


Home Medications: 


 Home Meds





metFORMIN [Glucophage] 500 mg PO DAILY 08/01/19 [History]


Lisinopril [Prinivil] 5 mg PO DAILY #30 tablet 08/02/19 [Rx]


atorvaSTATin [Lipitor] 40 mg PO BEDTIME #30 tablet 08/02/19 [Rx]








Patient Handouts:  Nonspecific Chest Pain, Easy-to-Read, Atorvastatin tablets, 

Lisinopril tablets


Referrals: 


[facility] [Outside]


Tere Beckman MD [Ordering Only Provider] - 08/07/19 9:30 am





- Discharge Summary/Plan Comment


DC Time >30 min.: No





- Patient Data


Vitals - Most Recent: 


 Last Vital Signs











Temp  35.9 C   08/02/19 09:00


 


Pulse  82   08/02/19 09:00


 


Resp  16   08/02/19 09:00


 


BP  135/75   08/02/19 10:09


 


Pulse Ox  96   08/02/19 09:00











Weight - Most Recent: 204.117 kg


I&O - Last 24 hours: 


 Intake & Output











 08/01/19 08/02/19 08/02/19





 22:59 06:59 14:59


 


Intake Total 940 1120 


 


Output Total 200 1000 


 


Balance 740 120 











Lab Results - Last 24 hrs: 


 Laboratory Results - last 24 hr











  08/01/19 08/01/19 08/01/19 Range/Units





  09:45 09:45 09:45 


 


WBC     (4.0-11.0)  K/uL


 


RBC     (4.50-5.90)  M/uL


 


Hgb     (13.0-17.0)  g/dL


 


Hct     (38.0-50.0)  %


 


MCV     (80.0-98.0)  fL


 


MCH     (27.0-32.0)  pg


 


MCHC     (31.0-37.0)  g/dL


 


RDW Std Deviation     (28.0-62.0)  fl


 


RDW Coeff of Dion     (11.0-15.0)  %


 


Plt Count     (150-400)  K/uL


 


MPV     (7.40-12.00)  fL


 


Nucleated RBC %     /100WBC


 


Nucleated RBCs #     K/uL


 


Sodium  139    (136-148)  mmol/L


 


Potassium  4.5    (3.5-5.1)  mmol/L


 


Chloride  102    ()  mmol/L


 


Carbon Dioxide  25.9    (21.0-32.0)  mmol/L


 


BUN  22 H    (7.0-18.0)  mg/dL


 


Creatinine  1.2    (0.8-1.3)  mg/dL


 


Est Cr Clr Drug Dosing  117.42    mL/min


 


Estimated GFR (MDRD)  > 60.0    ml/min


 


Glucose  172 H    ()  mg/dL


 


POC Glucose     ()  mg/dL


 


Hemoglobin A1c    7.3 H  (4.5-6.2)  %


 


Calcium  9.9    (8.5-10.1)  mg/dL


 


Total Bilirubin  0.4    (0.2-1.0)  mg/dL


 


AST  22    (15-37)  IU/L


 


ALT  65 H    (14-63)  IU/L


 


Alkaline Phosphatase  100    ()  U/L


 


Troponin I  < 0.050    (0.000-0.056)  ng/mL


 


Total Protein  8.2    (6.4-8.2)  g/dL


 


Albumin  4.5    (3.4-5.0)  g/dL


 


Globulin  3.7    (2.6-4.0)  g/dL


 


Albumin/Globulin Ratio  1.2    (0.9-1.6)  


 


Triglycerides   405 H   (0-200)  mg/dL


 


Cholesterol   221 H   ()  mg/dL


 


HDL Cholesterol   35 L   (40-60)  mg/dL


 


Cholesterol/HDL Ratio   6.3 H   (3.3-6.0)  


 


Free T4     (0.76-1.46)  ng/dL


 


Free T3     (2.18-3.98)  pg/mL


 


TSH 3rd Generation   4.35 H   (0.36-3.74)  uIU/mL


 


Urine Color     


 


Urine Appearance     


 


Urine pH     (5.0-8.0)  


 


Ur Specific Gravity     (1.001-1.035)  


 


Urine Protein     (NEGATIVE)  mg/dL


 


Urine Glucose (UA)     (NEGATIVE)  mg/dL


 


Urine Ketones     (NEGATIVE)  mg/dL


 


Urine Occult Blood     (NEGATIVE)  


 


Urine Nitrite     (NEGATIVE)  


 


Urine Bilirubin     (NEGATIVE)  


 


Urine Urobilinogen     (<2.0)  EU/dL


 


Ur Leukocyte Esterase     (NEGATIVE)  


 


Urine RBC     (0-2/HPF)  


 


Urine WBC     (0-5/HPF)  


 


Ur Epithelial Cells     (NONE-FEW)  


 


Urine Bacteria     (NEGATIVE)  


 


Urine Opiates Screen     (NEGATIVE)  


 


Ur Oxycodone Screen     (NEGATIVE)  


 


Urine Methadone Screen     (NEGATIVE)  


 


Ur Barbiturates Screen     (NEGATIVE)  


 


Ur Phencyclidine Scrn     (NEGATIVE)  


 


Ur Amphetamine Screen     (NEGATIVE)  


 


U Methamphetamines Scrn     (NEGATIVE)  


 


U Benzodiazepines Scrn     (NEGATIVE)  


 


U Cocaine Metab Screen     (NEGATIVE)  


 


U Marijuana (THC) Screen     (NEGATIVE)  














  08/01/19 08/01/19 08/01/19 Range/Units





  09:45 11:30 11:30 


 


WBC     (4.0-11.0)  K/uL


 


RBC     (4.50-5.90)  M/uL


 


Hgb     (13.0-17.0)  g/dL


 


Hct     (38.0-50.0)  %


 


MCV     (80.0-98.0)  fL


 


MCH     (27.0-32.0)  pg


 


MCHC     (31.0-37.0)  g/dL


 


RDW Std Deviation     (28.0-62.0)  fl


 


RDW Coeff of Dion     (11.0-15.0)  %


 


Plt Count     (150-400)  K/uL


 


MPV     (7.40-12.00)  fL


 


Nucleated RBC %     /100WBC


 


Nucleated RBCs #     K/uL


 


Sodium     (136-148)  mmol/L


 


Potassium     (3.5-5.1)  mmol/L


 


Chloride     ()  mmol/L


 


Carbon Dioxide     (21.0-32.0)  mmol/L


 


BUN     (7.0-18.0)  mg/dL


 


Creatinine     (0.8-1.3)  mg/dL


 


Est Cr Clr Drug Dosing     mL/min


 


Estimated GFR (MDRD)     ml/min


 


Glucose     ()  mg/dL


 


POC Glucose     ()  mg/dL


 


Hemoglobin A1c     (4.5-6.2)  %


 


Calcium     (8.5-10.1)  mg/dL


 


Total Bilirubin     (0.2-1.0)  mg/dL


 


AST     (15-37)  IU/L


 


ALT     (14-63)  IU/L


 


Alkaline Phosphatase     ()  U/L


 


Troponin I     (0.000-0.056)  ng/mL


 


Total Protein     (6.4-8.2)  g/dL


 


Albumin     (3.4-5.0)  g/dL


 


Globulin     (2.6-4.0)  g/dL


 


Albumin/Globulin Ratio     (0.9-1.6)  


 


Triglycerides     (0-200)  mg/dL


 


Cholesterol     ()  mg/dL


 


HDL Cholesterol     (40-60)  mg/dL


 


Cholesterol/HDL Ratio     (3.3-6.0)  


 


Free T4  1.31    (0.76-1.46)  ng/dL


 


Free T3  3.99 H    (2.18-3.98)  pg/mL


 


TSH 3rd Generation     (0.36-3.74)  uIU/mL


 


Urine Color   YELLOW   


 


Urine Appearance   CLEAR   


 


Urine pH   5.5   (5.0-8.0)  


 


Ur Specific Gravity   >= 1.030   (1.001-1.035)  


 


Urine Protein   TRACE H   (NEGATIVE)  mg/dL


 


Urine Glucose (UA)   250 H   (NEGATIVE)  mg/dL


 


Urine Ketones   TRACE H   (NEGATIVE)  mg/dL


 


Urine Occult Blood   NEGATIVE   (NEGATIVE)  


 


Urine Nitrite   NEGATIVE   (NEGATIVE)  


 


Urine Bilirubin   NEGATIVE   (NEGATIVE)  


 


Urine Urobilinogen   0.2   (<2.0)  EU/dL


 


Ur Leukocyte Esterase   NEGATIVE   (NEGATIVE)  


 


Urine RBC   0-1   (0-2/HPF)  


 


Urine WBC   0-1   (0-5/HPF)  


 


Ur Epithelial Cells   RARE   (NONE-FEW)  


 


Urine Bacteria   RARE   (NEGATIVE)  


 


Urine Opiates Screen    NEGATIVE  (NEGATIVE)  


 


Ur Oxycodone Screen    NEGATIVE  (NEGATIVE)  


 


Urine Methadone Screen    NEGATIVE  (NEGATIVE)  


 


Ur Barbiturates Screen    NEGATIVE  (NEGATIVE)  


 


Ur Phencyclidine Scrn    NEGATIVE  (NEGATIVE)  


 


Ur Amphetamine Screen    NEGATIVE  (NEGATIVE)  


 


U Methamphetamines Scrn    NEGATIVE  (NEGATIVE)  


 


U Benzodiazepines Scrn    NEGATIVE  (NEGATIVE)  


 


U Cocaine Metab Screen    NEGATIVE  (NEGATIVE)  


 


U Marijuana (THC) Screen    NEGATIVE  (NEGATIVE)  














  08/01/19 08/01/19 08/01/19 Range/Units





  16:06 17:03 20:08 


 


WBC     (4.0-11.0)  K/uL


 


RBC     (4.50-5.90)  M/uL


 


Hgb     (13.0-17.0)  g/dL


 


Hct     (38.0-50.0)  %


 


MCV     (80.0-98.0)  fL


 


MCH     (27.0-32.0)  pg


 


MCHC     (31.0-37.0)  g/dL


 


RDW Std Deviation     (28.0-62.0)  fl


 


RDW Coeff of Dion     (11.0-15.0)  %


 


Plt Count     (150-400)  K/uL


 


MPV     (7.40-12.00)  fL


 


Nucleated RBC %     /100WBC


 


Nucleated RBCs #     K/uL


 


Sodium     (136-148)  mmol/L


 


Potassium     (3.5-5.1)  mmol/L


 


Chloride     ()  mmol/L


 


Carbon Dioxide     (21.0-32.0)  mmol/L


 


BUN     (7.0-18.0)  mg/dL


 


Creatinine     (0.8-1.3)  mg/dL


 


Est Cr Clr Drug Dosing     mL/min


 


Estimated GFR (MDRD)     ml/min


 


Glucose     ()  mg/dL


 


POC Glucose   125 H  126 H  ()  mg/dL


 


Hemoglobin A1c     (4.5-6.2)  %


 


Calcium     (8.5-10.1)  mg/dL


 


Total Bilirubin     (0.2-1.0)  mg/dL


 


AST     (15-37)  IU/L


 


ALT     (14-63)  IU/L


 


Alkaline Phosphatase     ()  U/L


 


Troponin I  < 0.050    (0.000-0.056)  ng/mL


 


Total Protein     (6.4-8.2)  g/dL


 


Albumin     (3.4-5.0)  g/dL


 


Globulin     (2.6-4.0)  g/dL


 


Albumin/Globulin Ratio     (0.9-1.6)  


 


Triglycerides     (0-200)  mg/dL


 


Cholesterol     ()  mg/dL


 


HDL Cholesterol     (40-60)  mg/dL


 


Cholesterol/HDL Ratio     (3.3-6.0)  


 


Free T4     (0.76-1.46)  ng/dL


 


Free T3     (2.18-3.98)  pg/mL


 


TSH 3rd Generation     (0.36-3.74)  uIU/mL


 


Urine Color     


 


Urine Appearance     


 


Urine pH     (5.0-8.0)  


 


Ur Specific Gravity     (1.001-1.035)  


 


Urine Protein     (NEGATIVE)  mg/dL


 


Urine Glucose (UA)     (NEGATIVE)  mg/dL


 


Urine Ketones     (NEGATIVE)  mg/dL


 


Urine Occult Blood     (NEGATIVE)  


 


Urine Nitrite     (NEGATIVE)  


 


Urine Bilirubin     (NEGATIVE)  


 


Urine Urobilinogen     (<2.0)  EU/dL


 


Ur Leukocyte Esterase     (NEGATIVE)  


 


Urine RBC     (0-2/HPF)  


 


Urine WBC     (0-5/HPF)  


 


Ur Epithelial Cells     (NONE-FEW)  


 


Urine Bacteria     (NEGATIVE)  


 


Urine Opiates Screen     (NEGATIVE)  


 


Ur Oxycodone Screen     (NEGATIVE)  


 


Urine Methadone Screen     (NEGATIVE)  


 


Ur Barbiturates Screen     (NEGATIVE)  


 


Ur Phencyclidine Scrn     (NEGATIVE)  


 


Ur Amphetamine Screen     (NEGATIVE)  


 


U Methamphetamines Scrn     (NEGATIVE)  


 


U Benzodiazepines Scrn     (NEGATIVE)  


 


U Cocaine Metab Screen     (NEGATIVE)  


 


U Marijuana (THC) Screen     (NEGATIVE)  














  08/01/19 08/02/19 08/02/19 Range/Units





  22:24 05:25 05:25 


 


WBC   6.79   (4.0-11.0)  K/uL


 


RBC   5.65   (4.50-5.90)  M/uL


 


Hgb   15.0   (13.0-17.0)  g/dL


 


Hct   46.3   (38.0-50.0)  %


 


MCV   81.9   (80.0-98.0)  fL


 


MCH   26.5 L   (27.0-32.0)  pg


 


MCHC   32.4   (31.0-37.0)  g/dL


 


RDW Std Deviation   42.4   (28.0-62.0)  fl


 


RDW Coeff of Dion   14   (11.0-15.0)  %


 


Plt Count   286   (150-400)  K/uL


 


MPV   9.70   (7.40-12.00)  fL


 


Nucleated RBC %   0.0   /100WBC


 


Nucleated RBCs #   0   K/uL


 


Sodium    138  (136-148)  mmol/L


 


Potassium    4.3  (3.5-5.1)  mmol/L


 


Chloride    102  ()  mmol/L


 


Carbon Dioxide    28.3  (21.0-32.0)  mmol/L


 


BUN    18  (7.0-18.0)  mg/dL


 


Creatinine    0.8  (0.8-1.3)  mg/dL


 


Est Cr Clr Drug Dosing    180.57  mL/min


 


Estimated GFR (MDRD)    > 60.0  ml/min


 


Glucose    149 H  ()  mg/dL


 


POC Glucose     ()  mg/dL


 


Hemoglobin A1c     (4.5-6.2)  %


 


Calcium    9.5  (8.5-10.1)  mg/dL


 


Total Bilirubin     (0.2-1.0)  mg/dL


 


AST     (15-37)  IU/L


 


ALT     (14-63)  IU/L


 


Alkaline Phosphatase     ()  U/L


 


Troponin I  < 0.050    (0.000-0.056)  ng/mL


 


Total Protein     (6.4-8.2)  g/dL


 


Albumin     (3.4-5.0)  g/dL


 


Globulin     (2.6-4.0)  g/dL


 


Albumin/Globulin Ratio     (0.9-1.6)  


 


Triglycerides     (0-200)  mg/dL


 


Cholesterol     ()  mg/dL


 


HDL Cholesterol     (40-60)  mg/dL


 


Cholesterol/HDL Ratio     (3.3-6.0)  


 


Free T4     (0.76-1.46)  ng/dL


 


Free T3     (2.18-3.98)  pg/mL


 


TSH 3rd Generation     (0.36-3.74)  uIU/mL


 


Urine Color     


 


Urine Appearance     


 


Urine pH     (5.0-8.0)  


 


Ur Specific Gravity     (1.001-1.035)  


 


Urine Protein     (NEGATIVE)  mg/dL


 


Urine Glucose (UA)     (NEGATIVE)  mg/dL


 


Urine Ketones     (NEGATIVE)  mg/dL


 


Urine Occult Blood     (NEGATIVE)  


 


Urine Nitrite     (NEGATIVE)  


 


Urine Bilirubin     (NEGATIVE)  


 


Urine Urobilinogen     (<2.0)  EU/dL


 


Ur Leukocyte Esterase     (NEGATIVE)  


 


Urine RBC     (0-2/HPF)  


 


Urine WBC     (0-5/HPF)  


 


Ur Epithelial Cells     (NONE-FEW)  


 


Urine Bacteria     (NEGATIVE)  


 


Urine Opiates Screen     (NEGATIVE)  


 


Ur Oxycodone Screen     (NEGATIVE)  


 


Urine Methadone Screen     (NEGATIVE)  


 


Ur Barbiturates Screen     (NEGATIVE)  


 


Ur Phencyclidine Scrn     (NEGATIVE)  


 


Ur Amphetamine Screen     (NEGATIVE)  


 


U Methamphetamines Scrn     (NEGATIVE)  


 


U Benzodiazepines Scrn     (NEGATIVE)  


 


U Cocaine Metab Screen     (NEGATIVE)  


 


U Marijuana (THC) Screen     (NEGATIVE)  














  08/02/19 Range/Units





  07:22 


 


WBC   (4.0-11.0)  K/uL


 


RBC   (4.50-5.90)  M/uL


 


Hgb   (13.0-17.0)  g/dL


 


Hct   (38.0-50.0)  %


 


MCV   (80.0-98.0)  fL


 


MCH   (27.0-32.0)  pg


 


MCHC   (31.0-37.0)  g/dL


 


RDW Std Deviation   (28.0-62.0)  fl


 


RDW Coeff of Dion   (11.0-15.0)  %


 


Plt Count   (150-400)  K/uL


 


MPV   (7.40-12.00)  fL


 


Nucleated RBC %   /100WBC


 


Nucleated RBCs #   K/uL


 


Sodium   (136-148)  mmol/L


 


Potassium   (3.5-5.1)  mmol/L


 


Chloride   ()  mmol/L


 


Carbon Dioxide   (21.0-32.0)  mmol/L


 


BUN   (7.0-18.0)  mg/dL


 


Creatinine   (0.8-1.3)  mg/dL


 


Est Cr Clr Drug Dosing   mL/min


 


Estimated GFR (MDRD)   ml/min


 


Glucose   ()  mg/dL


 


POC Glucose  228 H  ()  mg/dL


 


Hemoglobin A1c   (4.5-6.2)  %


 


Calcium   (8.5-10.1)  mg/dL


 


Total Bilirubin   (0.2-1.0)  mg/dL


 


AST   (15-37)  IU/L


 


ALT   (14-63)  IU/L


 


Alkaline Phosphatase   ()  U/L


 


Troponin I   (0.000-0.056)  ng/mL


 


Total Protein   (6.4-8.2)  g/dL


 


Albumin   (3.4-5.0)  g/dL


 


Globulin   (2.6-4.0)  g/dL


 


Albumin/Globulin Ratio   (0.9-1.6)  


 


Triglycerides   (0-200)  mg/dL


 


Cholesterol   ()  mg/dL


 


HDL Cholesterol   (40-60)  mg/dL


 


Cholesterol/HDL Ratio   (3.3-6.0)  


 


Free T4   (0.76-1.46)  ng/dL


 


Free T3   (2.18-3.98)  pg/mL


 


TSH 3rd Generation   (0.36-3.74)  uIU/mL


 


Urine Color   


 


Urine Appearance   


 


Urine pH   (5.0-8.0)  


 


Ur Specific Gravity   (1.001-1.035)  


 


Urine Protein   (NEGATIVE)  mg/dL


 


Urine Glucose (UA)   (NEGATIVE)  mg/dL


 


Urine Ketones   (NEGATIVE)  mg/dL


 


Urine Occult Blood   (NEGATIVE)  


 


Urine Nitrite   (NEGATIVE)  


 


Urine Bilirubin   (NEGATIVE)  


 


Urine Urobilinogen   (<2.0)  EU/dL


 


Ur Leukocyte Esterase   (NEGATIVE)  


 


Urine RBC   (0-2/HPF)  


 


Urine WBC   (0-5/HPF)  


 


Ur Epithelial Cells   (NONE-FEW)  


 


Urine Bacteria   (NEGATIVE)  


 


Urine Opiates Screen   (NEGATIVE)  


 


Ur Oxycodone Screen   (NEGATIVE)  


 


Urine Methadone Screen   (NEGATIVE)  


 


Ur Barbiturates Screen   (NEGATIVE)  


 


Ur Phencyclidine Scrn   (NEGATIVE)  


 


Ur Amphetamine Screen   (NEGATIVE)  


 


U Methamphetamines Scrn   (NEGATIVE)  


 


U Benzodiazepines Scrn   (NEGATIVE)  


 


U Cocaine Metab Screen   (NEGATIVE)  


 


U Marijuana (THC) Screen   (NEGATIVE)  











Med Orders - Current: 


 Current Medications





Acetaminophen (Tylenol)  650 mg PO Q4H PRN


   PRN Reason: Pain (Mild 1-3)/fever


Hydrocodone Bitart/Acetaminophen (Norco 325-5 Mg)  1 tab PO Q4H PRN


   PRN Reason: Pain (moderate 4-6)


Atorvastatin Calcium (Lipitor)  40 mg PO BEDTIME [facility]


   Last Admin: 08/01/19 21:38 Dose:  Not Given


Enoxaparin Sodium (Lovenox)  40 mg SUBCUT Q24H [facility]


   Last Admin: 08/01/19 13:16 Dose:  40 mg


Insulin Aspart (Novolog)  0 unit SUBCUT TIDAC [facility]; Protocol


   Last Admin: 08/02/19 08:43 Dose:  Not Given


Lisinopril (Prinivil)  5 mg PO DAILY [facility]


   Last Admin: 08/02/19 10:09 Dose:  5 mg


Morphine Sulfate (Morphine)  2 mg IVPUSH Q2H PRN


   PRN Reason: Pain (severe 7-10)


   Stop: 08/02/19 11:45


Nitroglycerin (Nitrostat)  0.4 mg SL Q5M PRN


   PRN Reason: Chest Pain


Ondansetron HCl (Zofran Odt)  4 mg PO Q4H PRN


   PRN Reason: nausea, able to take PO


Ondansetron HCl (Zofran)  4 mg IVPUSH Q4H PRN


   PRN Reason: Nausea


Sodium Chloride (Saline Flush)  10 ml FLUSH ASDIRECTED PRN


   PRN Reason: Keep Vein Open


   Last Admin: 08/01/19 10:09 Dose:  10 ml


Sodium Chloride (Saline Flush)  2.5 ml FLUSH ASDIRECTED PRN


   PRN Reason: Keep Vein Open


   Last Admin: 08/01/19 10:09 Dose:  2.5 ml





Discontinued Medications





Aspirin (Aspirin)  324 mg PO ONETIME ONE


   Stop: 08/01/19 10:00


   Last Admin: 08/01/19 10:09 Dose:  324 mg


Nitroglycerin (Nitrostat)  0.4 mg SL Q5M PRN


   PRN Reason: Chest Pain


   Last Admin: 08/01/19 10:22 Dose:  0.4 mg


Nitroglycerin (Nitro-Bid 2%)  0.5 gm TOP ONETIME ONE


   Stop: 08/01/19 10:31


   Last Admin: 08/01/19 10:40 Dose:  0.5 gm











<Victor Manuel Rosas - Last Filed: 08/05/19 19:45>





- Patient Data


Vitals - Most Recent: 


 Last Vital Signs











Temp  35.9 C   08/02/19 09:00


 


Pulse  82   08/02/19 09:00


 


Resp  16   08/02/19 09:00


 


BP  135/75   08/02/19 10:09


 


Pulse Ox  96   08/02/19 09:00











Med Orders - Current: 


 Current Medications








Discontinued Medications





Acetaminophen (Tylenol)  650 mg PO Q4H PRN


   PRN Reason: Pain (Mild 1-3)/fever


Hydrocodone Bitart/Acetaminophen (Norco 325-5 Mg)  1 tab PO Q4H PRN


   PRN Reason: Pain (moderate 4-6)


Aspirin (Aspirin)  324 mg PO ONETIME ONE


   Stop: 08/01/19 10:00


   Last Admin: 08/01/19 10:09 Dose:  324 mg


Atorvastatin Calcium (Lipitor)  40 mg PO BEDTIME [facility]


   Last Admin: 08/01/19 21:38 Dose:  Not Given


Enoxaparin Sodium (Lovenox)  40 mg SUBCUT Q24H [facility]


   Last Admin: 08/01/19 13:16 Dose:  40 mg


Insulin Aspart (Novolog)  0 unit SUBCUT TIDAC [facility]; Protocol


   Last Admin: 08/02/19 08:43 Dose:  Not Given


Lisinopril (Prinivil)  5 mg PO DAILY [facility]


   Last Admin: 08/02/19 10:09 Dose:  5 mg


Morphine Sulfate (Morphine)  2 mg IVPUSH Q2H PRN


   PRN Reason: Pain (severe 7-10)


   Stop: 08/02/19 11:45


Nitroglycerin (Nitrostat)  0.4 mg SL Q5M PRN


   PRN Reason: Chest Pain


   Last Admin: 08/01/19 10:22 Dose:  0.4 mg


Nitroglycerin (Nitro-Bid 2%)  0.5 gm TOP ONETIME ONE


   Stop: 08/01/19 10:31


   Last Admin: 08/01/19 10:40 Dose:  0.5 gm


Nitroglycerin (Nitrostat)  0.4 mg SL Q5M PRN


   PRN Reason: Chest Pain


Ondansetron HCl (Zofran Odt)  4 mg PO Q4H PRN


   PRN Reason: nausea, able to take PO


Ondansetron HCl (Zofran)  4 mg IVPUSH Q4H PRN


   PRN Reason: Nausea


Sodium Chloride (Saline Flush)  10 ml FLUSH ASDIRECTED PRN


   PRN Reason: Keep Vein Open


   Last Admin: 08/01/19 10:09 Dose:  10 ml


Sodium Chloride (Saline Flush)  2.5 ml FLUSH ASDIRECTED PRN


   PRN Reason: Keep Vein Open


   Last Admin: 08/01/19 10:09 Dose:  2.5 ml











- Free Text/Narrative


Note: 





I have evaluated the patient.  I have discussed findings and treatment plan 

with resident.  I agree with the assessment and plan outlined in the following 

note.

## 2019-08-07 NOTE — ECHO
The echocardiogram report can be seen in this patient's EMR (Electronic Medical 
Record) in the Reports section.

The echocardiogram report has also been scanned into PACS and can be seen there 
as well.
FAISAL

## 2022-01-31 ENCOUNTER — HOSPITAL ENCOUNTER (EMERGENCY)
Dept: HOSPITAL 56 - MW.ED | Age: 32
Discharge: HOME | End: 2022-01-31
Payer: COMMERCIAL

## 2022-01-31 VITALS — HEART RATE: 81 BPM | DIASTOLIC BLOOD PRESSURE: 66 MMHG | SYSTOLIC BLOOD PRESSURE: 144 MMHG

## 2022-01-31 DIAGNOSIS — E11.9: ICD-10-CM

## 2022-01-31 DIAGNOSIS — R53.83: ICD-10-CM

## 2022-01-31 DIAGNOSIS — R51.9: ICD-10-CM

## 2022-01-31 DIAGNOSIS — I10: ICD-10-CM

## 2022-01-31 DIAGNOSIS — Z20.822: ICD-10-CM

## 2022-01-31 DIAGNOSIS — R05.9: Primary | ICD-10-CM

## 2025-05-06 ENCOUNTER — HOSPITAL ENCOUNTER (EMERGENCY)
Dept: HOSPITAL 56 - MW.ED | Age: 35
Discharge: HOME | End: 2025-05-06
Payer: COMMERCIAL

## 2025-05-06 VITALS — SYSTOLIC BLOOD PRESSURE: 135 MMHG | HEART RATE: 101 BPM | DIASTOLIC BLOOD PRESSURE: 78 MMHG

## 2025-05-06 DIAGNOSIS — I10: ICD-10-CM

## 2025-05-06 DIAGNOSIS — E11.9: ICD-10-CM

## 2025-05-06 DIAGNOSIS — L03.115: Primary | ICD-10-CM

## 2025-05-06 DIAGNOSIS — E78.00: ICD-10-CM

## 2025-05-06 DIAGNOSIS — Z75.3: ICD-10-CM

## 2025-05-06 DIAGNOSIS — Z79.899: ICD-10-CM

## 2025-05-06 LAB
ALBUMIN SERPL-MCNC: 3.2 G/DL (ref 3.4–5)
ALBUMIN/GLOB SERPL: 0.6 {RATIO} (ref 0.9–1.6)
BASOPHILS # BLD AUTO: 0.04 K/UL (ref 0–0.2)
BASOPHILS NFR BLD AUTO: 0.3 % (ref 0–1)
CALCIUM SERPL-MCNC: 9.3 MG/DL (ref 8.5–10.1)
CO2 SERPL-SCNC: 24.1 MMOL/L (ref 21–32)
CREAT CL 24H UR+SERPL-VRATE: 124.22 ML/MIN
CREAT SERPL-MCNC: 1.1 MG/DL (ref 0.8–1.3)
EOSINOPHIL # BLD AUTO: 0.01 K/UL (ref 0–0.45)
EOSINOPHIL NFR BLD AUTO: 0.1 % (ref 0–6)
GLOBULIN SER-MCNC: 5.4 G/DL (ref 2.6–4)
HCT VFR BLD AUTO: 42.6 % (ref 42–52)
HGB BLD-MCNC: 14.4 G/DL (ref 14–18)
IMM GRANULOCYTES # BLD: 0.03 K/UL (ref 0–0.05)
IMM GRANULOCYTES NFR BLD: 0.2 % (ref 0–0.4)
LYMPHOCYTES # BLD AUTO: 1.64 K/UL (ref 1–4.8)
LYMPHOCYTES NFR BLD AUTO: 12.8 % (ref 24–44)
MCH RBC QN AUTO: 26.2 PG (ref 28–32)
MCHC RBC AUTO-ENTMCNC: 33.8 G/DL (ref 32–36)
MCHC RBC AUTO-ENTMCNC: 77.6 FL (ref 83–99)
MONOCYTES # BLD AUTO: 1.08 K/UL (ref 0–0.8)
MONOCYTES NFR BLD AUTO: 8.4 % (ref 0–8)
NEUTROPHILS # BLD AUTO: 10.05 K/UL (ref 1.8–7.7)
NEUTROPHILS NFR BLD AUTO: 78.2 % (ref 41–71)
PLATELET # BLD AUTO: 281 K/UL (ref 150–400)
PMV BLD AUTO: 9.2 FL (ref 9.4–12.4)
POTASSIUM SERPL-SCNC: 3.7 MMOL/L (ref 3.5–5.1)
PROT SERPL-MCNC: 8.6 G/DL (ref 6.4–8.2)
RBC # BLD AUTO: 5.49 M/UL (ref 4.52–5.9)
WBC # BLD AUTO: 12.85 K/UL (ref 3.9–11.3)

## 2025-05-06 PROCEDURE — 99284 EMERGENCY DEPT VISIT MOD MDM: CPT

## 2025-05-06 PROCEDURE — 83605 ASSAY OF LACTIC ACID: CPT

## 2025-05-06 PROCEDURE — 80053 COMPREHEN METABOLIC PANEL: CPT

## 2025-05-06 PROCEDURE — 87040 BLOOD CULTURE FOR BACTERIA: CPT

## 2025-05-06 PROCEDURE — 36415 COLL VENOUS BLD VENIPUNCTURE: CPT

## 2025-05-06 PROCEDURE — 96375 TX/PRO/DX INJ NEW DRUG ADDON: CPT

## 2025-05-06 PROCEDURE — 96374 THER/PROPH/DIAG INJ IV PUSH: CPT

## 2025-05-06 PROCEDURE — 85025 COMPLETE CBC W/AUTO DIFF WBC: CPT

## 2025-05-06 PROCEDURE — 93971 EXTREMITY STUDY: CPT

## 2025-05-06 PROCEDURE — 96361 HYDRATE IV INFUSION ADD-ON: CPT

## 2025-05-06 RX ADMIN — SODIUM CHLORIDE ONE MLS/HR: 900 INJECTION, SOLUTION INTRAVENOUS at 12:32

## 2025-05-06 RX ADMIN — ONDANSETRON PRN MG: 2 INJECTION, SOLUTION INTRAMUSCULAR; INTRAVENOUS at 12:31

## 2025-05-06 RX ADMIN — KETOROLAC TROMETHAMINE ONE MG: 30 INJECTION, SOLUTION INTRAMUSCULAR at 12:31
